# Patient Record
(demographics unavailable — no encounter records)

---

## 2019-01-16 NOTE — ERD
ER Documentation


Chief Complaint


Chief Complaint





possible seizure about 30 minutes ago





HPI


This is a 1 year and 2-month-old boy who was brought in by mother in the 


emergency department for a seizure that happened at around 7:00 p.m.  Has been 


having coughing since last night.  Mother stated that this lasted about a few 


seconds.  





Mother stated patient did not experience on oral trauma, head injury, foul-


smelling urine, constipation, diarrhea.





Full term and birth via normal vaginal delivery without comp occasions.  


Up-to-date on immunizations.  Not exposed to secondhand smoking.





ROS


All systems reviewed and are negative except as per history of present illness.





Medications


Home Meds


Active Scripts


Albuterol Sulfate* (Albuterol Sulfate* Liq) 2 Mg/5 Ml Syrup, 1 ML PO TID PRN for


COUGH, #60 ML


   Prov:PASILABAN,LUCIAAR F         1/16/19


Humidifier (HUMIDIFIER) 1 Each Each, EACH MC, #1


   Prov:PASILABAN,LUCIAAR F         1/16/19


Electrolyte,Oral (Pedialyte) 1,000 Ml Solution, 100 ML PO Q6 PRN for prevent 


dehydration, #500 ML


   Prov:PASILABAN,LUCIAAR F         1/16/19


Prednisolone* (Prelone*) 15 Mg/5 Ml Solution, 4 ML PO DAILY for 5 Days, BOTTLE


   Prov:PASILABAN,LUCIAAR F         1/16/19


Ibuprofen (MOTRIN LIQUID (PED)) 20 Mg/Ml Susp, 6 ML PO Q6H PRN for PAIN AND OR 


ELEVATED TEMP, #5 OZ


   Prov:PASILABAN,KLAR F         1/16/19


Acetaminophen* (Acetaminophen* Susp) 160 Mg/5 Ml Oral.susp, 5.5 ML PO Q4H PRN 


for PAIN OR FEVER MDD 5, #5 OZ


   Prov:PASILABAN,KLAR F         1/16/19





Allergies


Allergies:  


Coded Allergies:  


     No Known Drug Allergies (Verified  Allergy, Unknown, 1/16/19)





PMhx/Soc


Medical and Surgical Hx:  pt denies Medical Hx, pt denies Surgical Hx


Hx Alcohol Use:  No


Hx Substance Use:  No


Hx Tobacco Use:  No


Smoking Status:  Never smoker





Physical Exam


Vitals





Vital Signs


  Date      Temp   Pulse  Resp  B/P (MAP)  Pulse Ox  O2          O2 Flow    FiO2


Time                                                 Delivery    Rate


   1/16/19   98.5


     22:52


   1/16/19  103.1


     21:13


   1/16/19  103.1


     21:13


   1/16/19  103.1


     21:13


   1/16/19  101.7


     20:29


   1/16/19  103.1    174    30                   98


     19:57





Physical Exam


Const:   No acute distress


Head:   Atraumatic 


Eyes:    Normal Conjunctiva


ENT:    Normal External Ears, Nose and Mouth.  Bilateral ears: TMs are 


erythematous.  No bleeding.  No discharge.  No hearing loss.  Nose: Midline.  No


nasal flaring.  Throat/lips: No tongue swelling/laceration.  No lip 


swelling/laceration.  No signs of tooth avulsions.  Uvula is in midline and 


nondisplaced.  Tonsils are +2 bilaterally with redness and without exudates.


Neck:               Full range of motion. No meningismus.  No nuchal rigidity.  


No signs of meningeal irritation.


Resp:   Clear to auscultation bilaterally.  No retractions noted.


Cardio:   Regular rate and rhythm, no murmurs


Abd:    Soft, non tender, non distended. Normal bowel sounds


Skin:   No petechiae or rashes


Back:   No midline or flank tenderness


Ext:    No cyanosis, or edema


Neur:   Awake and alert.  No neurological deficit.. 


Psych:    Normal Mood and Affect


Results 24 hrs





Current Medications


 Medications
   Dose
          Sig/Ny
       Start Time
   Status  Last


 (Trade)       Ordered        Route
 PRN     Stop Time              Admin
Dose


                              Reason                                Admin


                120 mg         ONCE  ONCE
    1/16/19       DC           1/16/19


Acetaminophen                 MA
            21:30
                       21:13




  (Tylenol                                  1/16/19 21:31


Supp)


                80 mg          ONCE  ONCE
    1/16/19       DC           1/16/19


Acetaminophen                 MA
            21:30
                       21:13




  (Tylenol                                  1/16/19 21:31


Supp)


 Ibuprofen
     110 mg         ONCE  STAT
    1/16/19       DC           1/16/19


(Motrin                       PO
            21:01
                       21:13



Liquid
                                      1/16/19 21:05


(Ped))








Procedures/MDM


Diagnostic tests: 


RSV: Negative.


Influenza A and B: Negative for influenza A.  Negative for influenza B.


Rapid strep screen: Negative.


Urinalysis: Unable to obtain.


Chest x-ray: Mild peribronchial thickening without focal consolidation.  


Findings suggest viral bronchiolitis or reactive airway disease.





Treatment: Tylenol suppository.  Motrin p.o.  Ice pack.





Re-evaluation: Temperature responded to antipyretic medication.  No episode of 


seizures here in the emergency department.  Patient is smiling and playful.  No 


neurological deficits.





Differential diagnosis 


I have low suspicion for sepsis, severe serious bacterial infection, meningitis,


ammonia, severe dehydration.





Final diagnosis: Febrile seizures.  Bronchiolitis.  Otitis media.





Prescription: Amoxicillin.  Prelone.  Tylenol.  Zofran.  Pedialyte.





Follow-up with pediatrician in the next 24-48 hours.  Come back here in the 


emergency department for any new symptoms or any worsening symptoms.  





All questions and concerns were answered.  Parents verbalized understanding and 


agreed with plan of care.  





Hemodynamically stable on discharge.





Departure


Diagnosis:  


   Primary Impression:  


   Febrile seizure


   Additional Impressions:  


   Otitis media


   Bronchiolitis


Condition:  Stable





Additional Instructions:  


Follow-up with pediatrician in the next 24-48 hours.  Come back here in the 


emergency department for any new symptoms or any worsening symptoms.











EDWIN POMPA               Jan 16, 2019 21:07